# Patient Record
Sex: FEMALE | Employment: OTHER | URBAN - METROPOLITAN AREA
[De-identification: names, ages, dates, MRNs, and addresses within clinical notes are randomized per-mention and may not be internally consistent; named-entity substitution may affect disease eponyms.]

---

## 2022-08-25 RX ORDER — ACETAMINOPHEN 160 MG
2000 TABLET,DISINTEGRATING ORAL DAILY
COMMUNITY

## 2022-08-25 RX ORDER — LOSARTAN POTASSIUM AND HYDROCHLOROTHIAZIDE 12.5; 1 MG/1; MG/1
1 TABLET ORAL DAILY
COMMUNITY

## 2022-08-25 RX ORDER — LANSOPRAZOLE 15 MG/1
15 CAPSULE, DELAYED RELEASE ORAL DAILY PRN
COMMUNITY

## 2022-08-25 RX ORDER — UBIDECARENONE 100 MG
100 CAPSULE ORAL DAILY
COMMUNITY

## 2022-08-25 NOTE — PAT NURSING NOTE
Patient states her name is Adis, but her 's license states 'Jing Carrillo'. msg left for Dara Clifford, surgery scheduler requesting modified SSRF reflecting name as on D.L. Also requested EKG done with her PCP and cardiac clearance she had done to add to chart.

## 2022-08-27 ENCOUNTER — LAB ENCOUNTER (OUTPATIENT)
Dept: LAB | Age: 72
End: 2022-08-27
Attending: OBSTETRICS & GYNECOLOGY
Payer: MEDICARE

## 2022-08-27 DIAGNOSIS — Z01.818 PRE-OP TESTING: ICD-10-CM

## 2022-08-27 LAB
ANTIBODY SCREEN: NEGATIVE
BASOPHILS # BLD AUTO: 0.05 X10(3) UL (ref 0–0.2)
BASOPHILS NFR BLD AUTO: 0.5 %
EOSINOPHIL # BLD AUTO: 0.11 X10(3) UL (ref 0–0.7)
EOSINOPHIL NFR BLD AUTO: 1.2 %
ERYTHROCYTE [DISTWIDTH] IN BLOOD BY AUTOMATED COUNT: 12.5 %
HCT VFR BLD AUTO: 39.9 %
HGB BLD-MCNC: 13 G/DL
IMM GRANULOCYTES # BLD AUTO: 0.02 X10(3) UL (ref 0–1)
IMM GRANULOCYTES NFR BLD: 0.2 %
LYMPHOCYTES # BLD AUTO: 2.47 X10(3) UL (ref 1–4)
LYMPHOCYTES NFR BLD AUTO: 26.7 %
MCH RBC QN AUTO: 30.1 PG (ref 26–34)
MCHC RBC AUTO-ENTMCNC: 32.6 G/DL (ref 31–37)
MCV RBC AUTO: 92.4 FL
MONOCYTES # BLD AUTO: 0.64 X10(3) UL (ref 0.1–1)
MONOCYTES NFR BLD AUTO: 6.9 %
NEUTROPHILS # BLD AUTO: 5.96 X10 (3) UL (ref 1.5–7.7)
NEUTROPHILS # BLD AUTO: 5.96 X10(3) UL (ref 1.5–7.7)
NEUTROPHILS NFR BLD AUTO: 64.5 %
PLATELET # BLD AUTO: 254 10(3)UL (ref 150–450)
RBC # BLD AUTO: 4.32 X10(6)UL
RH BLOOD TYPE: POSITIVE
WBC # BLD AUTO: 9.3 X10(3) UL (ref 4–11)

## 2022-08-27 PROCEDURE — 36415 COLL VENOUS BLD VENIPUNCTURE: CPT

## 2022-08-27 PROCEDURE — 86850 RBC ANTIBODY SCREEN: CPT

## 2022-08-27 PROCEDURE — 86901 BLOOD TYPING SEROLOGIC RH(D): CPT

## 2022-08-27 PROCEDURE — 85025 COMPLETE CBC W/AUTO DIFF WBC: CPT

## 2022-08-27 PROCEDURE — 86900 BLOOD TYPING SEROLOGIC ABO: CPT

## 2022-08-28 LAB
RH BLOOD TYPE: POSITIVE
SARS-COV-2 RNA RESP QL NAA+PROBE: NOT DETECTED

## 2022-08-29 PROBLEM — N39.46 MIXED STRESS AND URGE URINARY INCONTINENCE: Status: ACTIVE | Noted: 2022-08-29

## 2022-08-29 PROBLEM — N81.2 INCOMPLETE UTEROVAGINAL PROLAPSE: Status: ACTIVE | Noted: 2022-08-29

## 2022-08-30 ENCOUNTER — HOSPITAL ENCOUNTER (OUTPATIENT)
Facility: HOSPITAL | Age: 72
Setting detail: HOSPITAL OUTPATIENT SURGERY
Discharge: HOME | End: 2022-08-30
Attending: OBSTETRICS & GYNECOLOGY | Admitting: OBSTETRICS & GYNECOLOGY
Payer: MEDICARE

## 2022-08-30 ENCOUNTER — ANESTHESIA EVENT (OUTPATIENT)
Dept: SURGERY | Facility: HOSPITAL | Age: 72
End: 2022-08-30
Payer: MEDICARE

## 2022-08-30 ENCOUNTER — ANESTHESIA (OUTPATIENT)
Dept: SURGERY | Facility: HOSPITAL | Age: 72
End: 2022-08-30
Payer: MEDICARE

## 2022-08-30 VITALS
HEIGHT: 67 IN | WEIGHT: 173 LBS | TEMPERATURE: 98 F | BODY MASS INDEX: 27.15 KG/M2 | OXYGEN SATURATION: 99 % | HEART RATE: 56 BPM | SYSTOLIC BLOOD PRESSURE: 142 MMHG | RESPIRATION RATE: 14 BRPM | DIASTOLIC BLOOD PRESSURE: 69 MMHG

## 2022-08-30 DIAGNOSIS — Z01.818 PRE-OP TESTING: Primary | ICD-10-CM

## 2022-08-30 PROCEDURE — 0UT24ZZ RESECTION OF BILATERAL OVARIES, PERCUTANEOUS ENDOSCOPIC APPROACH: ICD-10-PCS | Performed by: OBSTETRICS & GYNECOLOGY

## 2022-08-30 PROCEDURE — 88309 TISSUE EXAM BY PATHOLOGIST: CPT | Performed by: OBSTETRICS & GYNECOLOGY

## 2022-08-30 PROCEDURE — 88300 SURGICAL PATH GROSS: CPT | Performed by: OBSTETRICS & GYNECOLOGY

## 2022-08-30 PROCEDURE — 0UT74ZZ RESECTION OF BILATERAL FALLOPIAN TUBES, PERCUTANEOUS ENDOSCOPIC APPROACH: ICD-10-PCS | Performed by: OBSTETRICS & GYNECOLOGY

## 2022-08-30 PROCEDURE — 0UT94ZZ RESECTION OF UTERUS, PERCUTANEOUS ENDOSCOPIC APPROACH: ICD-10-PCS | Performed by: OBSTETRICS & GYNECOLOGY

## 2022-08-30 PROCEDURE — 0USG4ZZ REPOSITION VAGINA, PERCUTANEOUS ENDOSCOPIC APPROACH: ICD-10-PCS | Performed by: OBSTETRICS & GYNECOLOGY

## 2022-08-30 PROCEDURE — 88307 TISSUE EXAM BY PATHOLOGIST: CPT | Performed by: OBSTETRICS & GYNECOLOGY

## 2022-08-30 PROCEDURE — 0TSD0ZZ REPOSITION URETHRA, OPEN APPROACH: ICD-10-PCS | Performed by: OBSTETRICS & GYNECOLOGY

## 2022-08-30 PROCEDURE — 8E0W4CZ ROBOTIC ASSISTED PROCEDURE OF TRUNK REGION, PERCUTANEOUS ENDOSCOPIC APPROACH: ICD-10-PCS | Performed by: OBSTETRICS & GYNECOLOGY

## 2022-08-30 DEVICE — SINGLE INCISION SLING SYSTEM
Type: IMPLANTABLE DEVICE | Site: VAGINA | Status: FUNCTIONAL
Brand: ALTIS

## 2022-08-30 DEVICE — POLYPROPYLENE MESH FOR SACROCOLPOSUSPENSION/SACROCOLPOPEXY - L
Type: IMPLANTABLE DEVICE | Site: VAGINA | Status: FUNCTIONAL
Brand: RESTORELLE

## 2022-08-30 RX ORDER — EPHEDRINE SULFATE 50 MG/ML
INJECTION, SOLUTION INTRAVENOUS AS NEEDED
Status: DISCONTINUED | OUTPATIENT
Start: 2022-08-30 | End: 2022-08-30 | Stop reason: SURG

## 2022-08-30 RX ORDER — GLYCOPYRROLATE 0.2 MG/ML
INJECTION, SOLUTION INTRAMUSCULAR; INTRAVENOUS AS NEEDED
Status: DISCONTINUED | OUTPATIENT
Start: 2022-08-30 | End: 2022-08-30 | Stop reason: SURG

## 2022-08-30 RX ORDER — HYDROMORPHONE HYDROCHLORIDE 1 MG/ML
0.2 INJECTION, SOLUTION INTRAMUSCULAR; INTRAVENOUS; SUBCUTANEOUS EVERY 5 MIN PRN
Status: DISCONTINUED | OUTPATIENT
Start: 2022-08-30 | End: 2022-08-30

## 2022-08-30 RX ORDER — MORPHINE SULFATE 4 MG/ML
4 INJECTION, SOLUTION INTRAMUSCULAR; INTRAVENOUS EVERY 10 MIN PRN
Status: DISCONTINUED | OUTPATIENT
Start: 2022-08-30 | End: 2022-08-30

## 2022-08-30 RX ORDER — BUPIVACAINE HYDROCHLORIDE 2.5 MG/ML
INJECTION, SOLUTION EPIDURAL; INFILTRATION; INTRACAUDAL AS NEEDED
Status: DISCONTINUED | OUTPATIENT
Start: 2022-08-30 | End: 2022-08-30 | Stop reason: HOSPADM

## 2022-08-30 RX ORDER — MORPHINE SULFATE 4 MG/ML
2 INJECTION, SOLUTION INTRAMUSCULAR; INTRAVENOUS EVERY 10 MIN PRN
Status: DISCONTINUED | OUTPATIENT
Start: 2022-08-30 | End: 2022-08-30

## 2022-08-30 RX ORDER — DEXAMETHASONE SODIUM PHOSPHATE 4 MG/ML
VIAL (ML) INJECTION AS NEEDED
Status: DISCONTINUED | OUTPATIENT
Start: 2022-08-30 | End: 2022-08-30 | Stop reason: SURG

## 2022-08-30 RX ORDER — NALOXONE HYDROCHLORIDE 0.4 MG/ML
80 INJECTION, SOLUTION INTRAMUSCULAR; INTRAVENOUS; SUBCUTANEOUS AS NEEDED
Status: DISCONTINUED | OUTPATIENT
Start: 2022-08-30 | End: 2022-08-30

## 2022-08-30 RX ORDER — ACETAMINOPHEN 500 MG
1000 TABLET ORAL ONCE
Status: DISCONTINUED | OUTPATIENT
Start: 2022-08-30 | End: 2022-08-30 | Stop reason: HOSPADM

## 2022-08-30 RX ORDER — SODIUM CHLORIDE, SODIUM LACTATE, POTASSIUM CHLORIDE, CALCIUM CHLORIDE 600; 310; 30; 20 MG/100ML; MG/100ML; MG/100ML; MG/100ML
INJECTION, SOLUTION INTRAVENOUS CONTINUOUS
Status: DISCONTINUED | OUTPATIENT
Start: 2022-08-30 | End: 2022-08-30

## 2022-08-30 RX ORDER — LIDOCAINE HYDROCHLORIDE 10 MG/ML
INJECTION, SOLUTION EPIDURAL; INFILTRATION; INTRACAUDAL; PERINEURAL AS NEEDED
Status: DISCONTINUED | OUTPATIENT
Start: 2022-08-30 | End: 2022-08-30 | Stop reason: SURG

## 2022-08-30 RX ORDER — ROCURONIUM BROMIDE 10 MG/ML
INJECTION, SOLUTION INTRAVENOUS AS NEEDED
Status: DISCONTINUED | OUTPATIENT
Start: 2022-08-30 | End: 2022-08-30 | Stop reason: SURG

## 2022-08-30 RX ORDER — BUPIVACAINE HYDROCHLORIDE AND EPINEPHRINE 2.5; 5 MG/ML; UG/ML
INJECTION, SOLUTION INFILTRATION; PERINEURAL AS NEEDED
Status: DISCONTINUED | OUTPATIENT
Start: 2022-08-30 | End: 2022-08-30 | Stop reason: HOSPADM

## 2022-08-30 RX ORDER — MORPHINE SULFATE 10 MG/ML
6 INJECTION, SOLUTION INTRAMUSCULAR; INTRAVENOUS EVERY 10 MIN PRN
Status: DISCONTINUED | OUTPATIENT
Start: 2022-08-30 | End: 2022-08-30

## 2022-08-30 RX ORDER — ONDANSETRON 2 MG/ML
INJECTION INTRAMUSCULAR; INTRAVENOUS AS NEEDED
Status: DISCONTINUED | OUTPATIENT
Start: 2022-08-30 | End: 2022-08-30 | Stop reason: SURG

## 2022-08-30 RX ORDER — CEFAZOLIN SODIUM/WATER 2 G/20 ML
2 SYRINGE (ML) INTRAVENOUS ONCE
Status: COMPLETED | OUTPATIENT
Start: 2022-08-30 | End: 2022-08-30

## 2022-08-30 RX ORDER — NEOSTIGMINE METHYLSULFATE 1 MG/ML
INJECTION, SOLUTION INTRAVENOUS AS NEEDED
Status: DISCONTINUED | OUTPATIENT
Start: 2022-08-30 | End: 2022-08-30 | Stop reason: SURG

## 2022-08-30 RX ORDER — PHENYLEPHRINE HCL 10 MG/ML
VIAL (ML) INJECTION AS NEEDED
Status: DISCONTINUED | OUTPATIENT
Start: 2022-08-30 | End: 2022-08-30 | Stop reason: SURG

## 2022-08-30 RX ORDER — HYDROMORPHONE HYDROCHLORIDE 1 MG/ML
0.4 INJECTION, SOLUTION INTRAMUSCULAR; INTRAVENOUS; SUBCUTANEOUS EVERY 5 MIN PRN
Status: DISCONTINUED | OUTPATIENT
Start: 2022-08-30 | End: 2022-08-30

## 2022-08-30 RX ORDER — HYDROMORPHONE HYDROCHLORIDE 1 MG/ML
0.6 INJECTION, SOLUTION INTRAMUSCULAR; INTRAVENOUS; SUBCUTANEOUS EVERY 5 MIN PRN
Status: DISCONTINUED | OUTPATIENT
Start: 2022-08-30 | End: 2022-08-30

## 2022-08-30 RX ORDER — SODIUM CHLORIDE 9 MG/ML
INJECTION, SOLUTION INTRAVENOUS CONTINUOUS PRN
Status: DISCONTINUED | OUTPATIENT
Start: 2022-08-30 | End: 2022-08-30 | Stop reason: SURG

## 2022-08-30 RX ADMIN — GLYCOPYRROLATE 0.2 MG: 0.2 INJECTION, SOLUTION INTRAMUSCULAR; INTRAVENOUS at 09:28:00

## 2022-08-30 RX ADMIN — EPHEDRINE SULFATE 10 MG: 50 INJECTION, SOLUTION INTRAVENOUS at 09:27:00

## 2022-08-30 RX ADMIN — ONDANSETRON 4 MG: 2 INJECTION INTRAMUSCULAR; INTRAVENOUS at 11:47:00

## 2022-08-30 RX ADMIN — SODIUM CHLORIDE, SODIUM LACTATE, POTASSIUM CHLORIDE, CALCIUM CHLORIDE: 600; 310; 30; 20 INJECTION, SOLUTION INTRAVENOUS at 09:10:00

## 2022-08-30 RX ADMIN — GLYCOPYRROLATE 0.6 MG: 0.2 INJECTION, SOLUTION INTRAMUSCULAR; INTRAVENOUS at 11:48:00

## 2022-08-30 RX ADMIN — PHENYLEPHRINE HCL 100 MCG: 10 MG/ML VIAL (ML) INJECTION at 09:22:00

## 2022-08-30 RX ADMIN — NEOSTIGMINE METHYLSULFATE 4 MG: 1 INJECTION, SOLUTION INTRAVENOUS at 11:48:00

## 2022-08-30 RX ADMIN — LIDOCAINE HYDROCHLORIDE 50 MG: 10 INJECTION, SOLUTION EPIDURAL; INFILTRATION; INTRACAUDAL; PERINEURAL at 09:10:00

## 2022-08-30 RX ADMIN — DEXAMETHASONE SODIUM PHOSPHATE 4 MG: 4 MG/ML VIAL (ML) INJECTION at 09:28:00

## 2022-08-30 RX ADMIN — EPHEDRINE SULFATE 5 MG: 50 INJECTION, SOLUTION INTRAVENOUS at 09:49:00

## 2022-08-30 RX ADMIN — CEFAZOLIN SODIUM/WATER 2 G: 2 G/20 ML SYRINGE (ML) INTRAVENOUS at 09:28:00

## 2022-08-30 RX ADMIN — SODIUM CHLORIDE, SODIUM LACTATE, POTASSIUM CHLORIDE, CALCIUM CHLORIDE: 600; 310; 30; 20 INJECTION, SOLUTION INTRAVENOUS at 11:56:00

## 2022-08-30 RX ADMIN — ROCURONIUM BROMIDE 80 MG: 10 INJECTION, SOLUTION INTRAVENOUS at 09:13:00

## 2022-08-30 RX ADMIN — SODIUM CHLORIDE: 9 INJECTION, SOLUTION INTRAVENOUS at 09:18:00

## 2022-08-30 RX ADMIN — PHENYLEPHRINE HCL 100 MCG: 10 MG/ML VIAL (ML) INJECTION at 09:28:00

## 2022-08-30 RX ADMIN — SODIUM CHLORIDE: 9 INJECTION, SOLUTION INTRAVENOUS at 11:51:00

## 2022-08-30 NOTE — OPERATIVE REPORT
[facility] Location: [facility] [id number] MRN [id number]   Admission Date 8/30/2022 Operation Date 8/30/2022   Attending Physician Sindy Somers MD Operating Physician Dillon Mo MD     Pre-Operative Diagnosis: Complete Uterovaginal Prolapse, Mixed Urinary Incontinence, Postmenopausal    Post-Operative Diagnosis: Complete Uterovaginal Prolapse, Mixed Urinary Incontinence, Postmenopausal    Procedure Performed:   1. Robotic-assisted Total Laparoscopic Hysterectomy, Bilateral Salpingoophorectomy  2. Robotic-assisted Laparoscopic Sacrocolpopexy with mesh graft  3. Robotic-assisted Laparoscopic Paravaginal Cystocele Repair  4. Single-Incision Midurethral Sling (Altis)  5. Cystoscopy with calibration    Surgeon:  Shanon James MD    Assistants:  Selin Giordano CSA (first assist). Surgical tech (second assist). Assistant tasks:  First assist opening, closing, bedside laparoscopic assistance. Second assist vaginal/uterine manipulation. Anesthesia: General    IVF:  1400cc    UO:  200cc    EBL: 25cc    Blood Administered:  None    Specimens:  Cervix, Uterus, Bilateral Fallopian Tubes and Ovaries    Drains:  Recinos catheter to gravity    Complications:  None    Condition:  Stable    Implants:  Restorelle-L, Altis single-incision midurethral sling    Findings:  Stage III Uterovaginal Prolapse. Normal Cervix, Uterus, Tubes/Ovaries. Evidence of prior tubal ligation with Falope rings. No intraabdominal adhesions or injury. Normal cystourethroscopy, no intraluminal mucosal lesions/tumors/injuries/foreign material.  Equal and brisk efflux of urine visualized from bilateral ureteral orifices. Summary of Case:  After the patient was identified and the planned procedure was reviewed and confirmed, she was brought to the operating room where anesthesia was obtained without difficulty. She was placed in the dorsal lithotomy position using the Yellow-Fin stirrups. Compression boots were in place and working during the entire procedure. Antibiotics were administered preoperatively for surgical prophylaxis. She was prepped and draped in standard sterile fashion. A time out procedure was performed confirming agreement of the planned procedure among all participating personnel. A Recinos catheter was placed to drain the bladder completely. The medium size V-Care uterine manipulator device was inserted without difficulty. Attention was turned to the abdomen where a small skin incision was made in the umbilicus and the Veres was advanced with correct placement confirmed noting a low opening pressure. The abdomen was insufflated and the 8mm trochar was inserted without difficulty. A survey of the abdomen revealed the above findings as well as no evidence of traumatic entry. Three additional 8mm robotic ports were placed under direct visualization. An additional 8mm accessory port was placed in the RUQ under direct visualization. The patient was placed in steep Trendelengerg position, the bowel was swept out of the pelvis and the robot was docked in a side-dock technique. We proceeded with the Bilateral Salpingoophorectomy. The round ligament was clamped, dessicated and transected. The posterior peritoneum was undermined and incised parallel to the IP ligament. A peritoneal window was created under the IP ligament noting to be well away from the underlying ureter. Using bipolar cautery, the IP ligament was clamped and dessicated, then transected just cephalad to the ovary. The broad ligament was further clamped, dessicated and transected parallel to the adnexa up to the level of the cornua. This dissection was performed bilaterally. We proceeded with the Aniya. Randell 105. The anterior leaf of the broad ligament was undermined and incised to the midline bilaterally and the bladder flap created sharply, this dissection was taken down below the level of the V-Care ring.   In a sequential fashion, broad ligaments, uterine arteries, and cardinal ligaments were dessicated and transected bilaterally using bipolapr and monopolar cautery. An anterior colpotomy was created allowing for visualization of the V-Care ring. The colpotomy was continued circumferentially until the uterus was amputated off the vagina. The cervix, uterus, and bilateral adnexa were removed from the abdomen through the vaginal incision and sent to pathology for routine analysis. The vaginal cuff was closed in a running fashion using a 0- V-lock suture. The pelvis was copiously irrigated and suctioned noting excellent hemostatis. We proceeded with the Sacrocolpopexy and Paravaginal Cystocele Repair with Restorelle-L mesh. The bladder flap was further developed both bluntly and sharply allowing for full exposure of the anterior vaginal wall. The peritoneum overlying the posterior vagina was tented down and incised. The rectovaginal space was dissected both sharply and bluntly exposing the fibromuscular layer of the vagina and  it from the underlying rectum. The recto-sigmoid was then deviated laterally exposing the sacral promontory. The overlying peritoneum was tented up and incised. The underlying fat pad was dessicated until exposing the anterior longitudinal ligament. Two pieces of Restorelle mesh were fashioned to accomodate the dimentions of the anterior and posterior walls of the vagina and placed into the abdomen. The respective pieces of mesh were tacked into place using a running weaving stitch of 2-0 V-lock suture. Care was taken to suture the lateral anteiror apical vagina to the mesh to repair the lateral cystocele. The vagina was elevated to normal anatomic position and the free arms of the mesh were attached to the anterior longitudinal ligament using two sutures of 2-0 Gortex. The excess mesh was excised and removed.   The peritoneum was closed over the sacral mesh attachment using 3-0 Vicryl suture. The rectosigmoid was released and allowed to fall back into the cul-de-sac. The pelvis was copiously irrigated and suctioned and excellent hemostasis was noted. The robot was undocked. The abdomen was desufflated and all instruments removed. The urethra was calibrated to accomodate the 17Fr cystoscope and a bladder survery revealed normal anatomy. There was equal and brisk efflux of urine/fluoresceine dye from the ureteral orifices bilaterally. There were no mucosal lesions, tumors, injuries, or foreign material.  The skin incisions were closed using 4-0 monocryl in a subcuticular fashion. Steri-strips were placed over all incisions. We proceeded with the Altis single-incision Midurethral Sling. A Recinos catheter was in place draining the bladder completely. The mid urethra was identified and the overlying epithelium was injected with dilute Marcaine solution. Bilateral anterior sulcus were injected with the solution to aid in hydrodistention and dissection of the path of the sling trochar. A 2cm vertical incision was created in the epithelium overlying the midurethra. Periurethral tunnels were created sharply to the level of the inferior pubic rami bilaterally. With the bladder completely emptied, the trochars were passed through the vaginal incision, medial and posterior to the inferior pubic ramus until the mesh anchor perforated the obturator muscle and membrane. This was performed bilaterally. The Recinos was removed. Cystoscopy was performed, noting appropriate placement of the sling with no identifiable bladder injury or foreign material.  The bladder was emptied and the Recinos was replaced. A hemostat was placed between the mesh and the suburethra and the sling was tensioned such that the hemostat could easily be removed and replaced without retraction of the sling, confirming the sling was not over-tensioned.   The tensioning suture was cut, the vagina was copiously irrigated and suctioned and the vaginal incision was closed using a running locking stitch of 2-0 Vicryl. The Recinos was placed to gravity. The vagina was copiously irrigated and suctioned. A vaginal sweep was negative for retained sponges. A rectal exam was performed confirming no rectal injury or suture in the lumen. Instrument counts were correct. The patient tolerated the procedure well. She was awakened and extubated in the OR and transferred to the PACU in stable condition.       Syed Mcclain MD  8/30/2022  11:59 AM

## 2022-08-30 NOTE — ANESTHESIA PROCEDURE NOTES
Airway  Date/Time: 8/30/2022 9:15 AM  Urgency: elective      General Information and Staff    Patient location during procedure: OR  Anesthesiologist: Penni Cheadle, MD  Performed: anesthesiologist     Indications and Patient Condition  Indications for airway management: anesthesia  Sedation level: deep  Preoxygenated: yes  Patient position: sniffing  Mask difficulty assessment: 1 - vent by mask    Final Airway Details  Final airway type: endotracheal airway      Successful airway: ETT  Cuffed: yes   Successful intubation technique: direct laryngoscopy  Facilitating devices/methods: intubating stylet  Endotracheal tube insertion site: oral  Blade: Familia  Blade size: #3  ETT size (mm): 7.0    Cormack-Lehane Classification: grade I - full view of glottis  Placement verified by: chest auscultation and capnometry   Measured from: lips  ETT to lips (cm): 21  Number of attempts at approach: 1    Additional Comments  Atraumatic, soft bite block

## 2022-08-30 NOTE — INTERVAL H&P NOTE
Pre-op Diagnosis: uterovaginal prolapse, complete stress incontinence    The above referenced H&P was reviewed by Beto Abbasi MD on 8/30/2022, the patient was examined and no significant changes have occurred in the patient's condition since the H&P was performed. I discussed with the patient and/or legal representative the potential benefits, risks and side effects of this procedure; the likelihood of the patient achieving goals; and potential problems that might occur during recuperation. I discussed reasonable alternatives to the procedure, including risks, benefits and side effects related to the alternatives and risks related to not receiving this procedure. We will proceed with procedure as planned.

## 2022-08-30 NOTE — ANESTHESIA PROCEDURE NOTES
Peripheral IV  Date/Time: 8/30/2022 9:18 AM  Inserted by: Dulce Mares MD    Placement  Needle size: 18 G  Laterality: left  Location: wrist  Site prep: alcohol  Technique: anatomical landmarks  Attempts: 1

## 2023-03-21 ENCOUNTER — APPOINTMENT (OUTPATIENT)
Dept: MAMMOGRAPHY | Age: 73
End: 2023-03-21
Attending: FAMILY MEDICINE

## 2023-04-08 ENCOUNTER — HOSPITAL ENCOUNTER (OUTPATIENT)
Dept: MAMMOGRAPHY | Age: 73
Discharge: HOME | End: 2023-04-08
Attending: FAMILY MEDICINE

## 2023-04-08 DIAGNOSIS — Z12.31 VISIT FOR SCREENING MAMMOGRAM: ICD-10-CM

## 2023-04-08 PROCEDURE — 77067 SCR MAMMO BI INCL CAD: CPT

## (undated) DEVICE — TRAY SKIN PREP PVP-1

## (undated) DEVICE — 1016 S-DRAPE IRRIG POUCH 10/BOX: Brand: STERI-DRAPE™

## (undated) DEVICE — LAPAROVUE VISIBILITY SYSTEM LAPAROSCOPIC SOLUTIONS: Brand: LAPAROVUE

## (undated) DEVICE — SUTURE VLOC 180 0 12" 0316

## (undated) DEVICE — SOL H2O 3000ML IRRIG

## (undated) DEVICE — ROBOTIC: Brand: MEDLINE INDUSTRIES, INC.

## (undated) DEVICE — GAMMEX® PI HYBRID SIZE 6.5, STERILE POWDER-FREE SURGICAL GLOVE, POLYISOPRENE AND NEOPRENE BLEND: Brand: GAMMEX

## (undated) DEVICE — MEDI-VAC NON-CONDUCTIVE SUCTION TUBING: Brand: CARDINAL HEALTH

## (undated) DEVICE — MEGA SUTURECUT ND: Brand: ENDOWRIST

## (undated) DEVICE — GOWN SURGICAL MICROCOOL XL LNG

## (undated) DEVICE — SUT MONOCRYL 4-0 PS-2 Y496G

## (undated) DEVICE — VIOLET BRAIDED (POLYGLACTIN 910), SYNTHETIC ABSORBABLE SUTURE: Brand: COATED VICRYL

## (undated) DEVICE — ARM DRAPE

## (undated) DEVICE — SUT VICRYL 2-0 CT-1 J945H

## (undated) DEVICE — VCARE MEDIUM, UTERINE MANIPULATOR, VAGINAL-CERVICAL-AHLUWALIA'S-RETRACTOR-ELEVATOR: Brand: VCARE

## (undated) DEVICE — PAD ALC 43.5X24IN PREP  LF

## (undated) DEVICE — LEGGINGS SRG 48X31IN CUF STRL

## (undated) DEVICE — DRAPE SHEET LAPCHOLE 124X100X7

## (undated) DEVICE — TRAY SURESTEP 16 BARDEX DRAIN

## (undated) DEVICE — TUBING CYSTO TUR DUAL

## (undated) DEVICE — TRI-LUMEN FILTERED TUBE SET WITH ACTIVATED CHARCOAL FILTER: Brand: AIRSEAL

## (undated) DEVICE — CANNULA SEAL

## (undated) DEVICE — SUT VICRYL 0 J906G

## (undated) DEVICE — CONTAINER GENT-L-KARE 4OZ GRAY

## (undated) DEVICE — DRAPE SHEET LARGE 76X55

## (undated) DEVICE — NEEDLE EPDRL 17GA 6IN TUO TUO

## (undated) DEVICE — SOLUTION  .9 1000ML BTL

## (undated) DEVICE — BLADELESS OBTURATOR: Brand: WECK VISTA

## (undated) DEVICE — STANDARD HYPODERMIC NEEDLE,POLYPROPYLENE HUB: Brand: MONOJECT

## (undated) DEVICE — SUTURE VLOC 180 2-0 9" 2145

## (undated) DEVICE — MARYLAND BIPOLAR FORCEPS: Brand: ENDOWRIST

## (undated) DEVICE — SUCTION CANISTER, 3000CC,SAFELINER: Brand: DEROYAL

## (undated) DEVICE — GOWN SURG AERO BLUE PERF LG

## (undated) DEVICE — ENCORE® LATEX ACCLAIM SIZE 7, STERILE LATEX POWDER-FREE SURGICAL GLOVE: Brand: ENCORE

## (undated) DEVICE — SYSTEM SURGYPAD VELCRO 36IN

## (undated) DEVICE — COLUMN DRAPE

## (undated) DEVICE — AIRSEAL 8 MM ACCESS PORT AND LOW PROFILE OBTURATOR WITH BLADELESS OPTICAL TIP, 120 MM LENGTH: Brand: AIRSEAL

## (undated) DEVICE — INSUFFLATION NEEDLE TO ESTABLISH PNEUMOPERITONEUM.: Brand: INSUFFLATION NEEDLE

## (undated) DEVICE — PROGRASP FORCEPS: Brand: ENDOWRIST

## (undated) DEVICE — MONOPOLAR CURVED SCISSORS: Brand: ENDOWRIST

## (undated) DEVICE — STERILE SURGICAL LUBRICANT, METAL TUBE: Brand: SURGILUBE

## (undated) DEVICE — ENCORE® LATEX ACCLAIM SIZE 7.5, STERILE LATEX POWDER-FREE SURGICAL GLOVE: Brand: ENCORE

## (undated) DEVICE — Device: Brand: JELCO

## (undated) DEVICE — SOL  .9 1000ML BAG

## (undated) DEVICE — ADHESIVE MASTISOL 2/3ML

## (undated) DEVICE — DRAPE,UNDRBUT,WHT GRAD PCH,CAPPORT,20/CS: Brand: MEDLINE

## (undated) DEVICE — TIP COVER ACCESSORY